# Patient Record
(demographics unavailable — no encounter records)

---

## 2024-10-09 NOTE — PHYSICAL EXAM
[No Acute Distress] : no acute distress [Normal Oropharynx] : normal oropharynx [Normal Appearance] : normal appearance [No Neck Mass] : no neck mass [Normal Rate/Rhythm] : normal rate/rhythm [Normal S1, S2] : normal s1, s2 [No Murmurs] : no murmurs [No Resp Distress] : no resp distress [Normal to Percussion] : normal to percussion [No Abnormalities] : no abnormalities [Benign] : benign [No HSM] : no hsm [Normal Gait] : normal gait [No Clubbing] : no clubbing [No Cyanosis] : no cyanosis [No Edema] : no edema [FROM] : FROM [Normal Color/ Pigmentation] : normal color/ pigmentation [No Focal Deficits] : no focal deficits [Oriented x3] : oriented x3 [Normal Affect] : normal affect [TextBox_68] : Decreased breath sounds bilaterally with prolongation of expiration.  No active wheezing except on forced expiratory maneuver.  No significant crackles.

## 2024-10-09 NOTE — DISCUSSION/SUMMARY
[FreeTextEntry1] : COPD Severe related to chronic prior tobacco use.  Improved on bronchodilator therapy.  Now stable. Dyspnea on exertion highly likely related to above.  Has improved.  Positive exercise limitation related to  Underlying pulmonary disease.

## 2024-10-09 NOTE — HISTORY OF PRESENT ILLNESS
[Former] : former [TextBox_4] : using breztri 2 puffs, mild sob with stairs or walks for 2 blocks about the same no worse or better gained weight from stopping smoking needs to exercise no mucus no coughing some hoarseness same has anxiety got Prevnar 20 got flu and covid vaccine 2 weeks ago  Saw cardiology for SOB. Workup negative and referred. [facility]AS 1-2 blocks and 1-2 flights.  Present for years but is progressive. Denies cough, wheezing, CP.    [TextBox_11] : 2 [TextBox_13] : 50 [YearQuit] : 2024

## 2024-10-09 NOTE — CONSULT LETTER
[Dear  ___] : Dear ~KIT, [Consult Letter:] : I had the pleasure of evaluating your patient, [unfilled]. [Consult Closing:] : Thank you very much for allowing me to participate in the care of this patient.  If you have any questions, please do not hesitate to contact me. [Sincerely,] : Sincerely, [DrLeón  ___] : Dr. ARANA [FreeTextEntry2] : Osman Rao MD [FreeTextEntry3] : [facility]

## 2024-10-09 NOTE — ASSESSMENT
[FreeTextEntry1] : cont Breztri 2 puffs twice a day. PRN beta agonist. May be a candidate for lung reduction in the future. F/U 4 months sooner PRN

## 2024-10-09 NOTE — PROCEDURE
[FreeTextEntry1] : Screening Ct 3/12/24 reviewed  March 6, 2024. 6 miin walk. Walked 650 feet.  No desaturation.  See attached.  10/09/2024 Pulmonary function testing These data demonstrate a severe ventilatory impairment in a obstructive pattern.  No significant change in flow rates compared to June 2024. .

## 2025-02-19 NOTE — PROCEDURE
[FreeTextEntry1] : Screening Ct 3/12/24 reviewed  02/19/2025 Pulmonary function testing These data demonstrate a severe ventilatory impairment in a obstructive pattern. There is no significant bronchodilator responce.There is elevation in the RV/TLC ratio indicative of possible air trapping. There is a severe diffusion impairment. No significant change in flow rates compared to prior studies.  Mild decrease in diffusion compared to February 2024.  March 6, 2024. 6 miin walk. Walked 650 feet.  No desaturation.  See attached.

## 2025-02-19 NOTE — ASSESSMENT
[FreeTextEntry1] : cont Breztri 2 puffs twice a day. PRN beta agonist. Discussed bronchoscopic lung reduction option.  Explained procedure.  Patient will investigate and decide. Due CT lung cancer screening. F/U 4 months sooner PRN

## 2025-02-19 NOTE — HISTORY OF PRESENT ILLNESS
[Former] : former [TextBox_4] : Got sick in December with fever and cough and took OTC meds does not feel breathing as good with walking or stairs has o2 sat at home notices 93 with walking no cough now no mucus or wheeze using breztri 2 puffs,  gained weight from stopping smoking needs to exercise no mucus no coughing some hoarseness same has anxiety got Prevnar 20 got flu and covid vaccine   Saw cardiology for SOB. Workup negative and referred. [facility]AS 1-2 blocks and 1-2 flights.  Present for years but is progressive. Denies cough, wheezing, CP.    [TextBox_11] : 2 [TextBox_13] : 50 [YearQuit] : 2024

## 2025-02-19 NOTE — DISCUSSION/SUMMARY
[FreeTextEntry1] : COPD Severe related to chronic prior tobacco use.  Improved on bronchodilator therapy.  Now relatively stable. Dyspnea on exertion highly likely related to above.  Has improved.  Positive exercise limitation related to  Underlying pulmonary disease.  Mild increase in symptoms since recent respiratory infection clinically and functionally stable.  Will observe.

## 2025-02-19 NOTE — CONSULT LETTER
[Dear  ___] : Dear ~KIT, [Consult Letter:] : I had the pleasure of evaluating your patient, [unfilled]. [Consult Closing:] : Thank you very much for allowing me to participate in the care of this patient.  If you have any questions, please do not hesitate to contact me. [Sincerely,] : Sincerely, [DrLeón  ___] : Dr. ARANA [FreeTextEntry2] : Osman Rao MD [FreeTextEntry3] : Junior Lowe MD [facility]